# Patient Record
Sex: MALE | Race: WHITE
[De-identification: names, ages, dates, MRNs, and addresses within clinical notes are randomized per-mention and may not be internally consistent; named-entity substitution may affect disease eponyms.]

---

## 2017-05-19 ENCOUNTER — HOSPITAL ENCOUNTER (OUTPATIENT)
Dept: HOSPITAL 39 - US | Age: 72
Discharge: HOME | End: 2017-05-19
Attending: FAMILY MEDICINE
Payer: MEDICARE

## 2017-05-19 DIAGNOSIS — R31.21: Primary | ICD-10-CM

## 2017-05-19 NOTE — US
EXAM DESCRIPTION: 



Renal



CLINICAL HISTORY: 



MICROSCOPIC HEMATURIA



COMPARISON: 



None Available.



TECHNIQUE: 



Multiple sonographic images of the kidneys and urinary bladder.



FINDINGS: 



The right kidney is normal measuring 10.6 cm. Slightly exophytic

16 mm midsegment renal cysts noted. No mass or hydronephrosis. 



The left kidney is normal measuring 11.0 cm. Echogenic stone

nonobstructing 3 mm stones noted within the left kidney. No cyst,

mass or evidence of hydronephrosis. 



Urinary bladder was nondistended and not evaluated.



IMPRESSION: 



1. Unremarkable renal ultrasound.



Electronically signed by:  Kaveh Urbano MD  5/19/2017 4:36 PM

CDT

## 2017-08-09 ENCOUNTER — HOSPITAL ENCOUNTER (OUTPATIENT)
Dept: HOSPITAL 39 - GMA | Age: 72
Discharge: HOME | End: 2017-08-09
Payer: MEDICARE

## 2017-08-09 DIAGNOSIS — R17: Primary | ICD-10-CM

## 2018-01-03 ENCOUNTER — HOSPITAL ENCOUNTER (OUTPATIENT)
Dept: HOSPITAL 39 - GMAM | Age: 73
Discharge: HOME | End: 2018-01-03
Attending: FAMILY MEDICINE
Payer: MEDICARE

## 2018-01-03 DIAGNOSIS — R73.9: Primary | ICD-10-CM

## 2018-01-03 DIAGNOSIS — Z12.5: ICD-10-CM

## 2018-01-03 DIAGNOSIS — Z13.220: ICD-10-CM

## 2018-11-19 ENCOUNTER — HOSPITAL ENCOUNTER (OUTPATIENT)
Dept: HOSPITAL 39 - GMAM | Age: 73
End: 2018-11-19
Attending: FAMILY MEDICINE
Payer: MEDICARE

## 2018-11-19 DIAGNOSIS — M25.50: Primary | ICD-10-CM

## 2018-11-20 ENCOUNTER — HOSPITAL ENCOUNTER (OUTPATIENT)
Dept: HOSPITAL 39 - CT | Age: 73
End: 2018-11-20
Attending: FAMILY MEDICINE
Payer: MEDICARE

## 2018-11-20 DIAGNOSIS — J98.11: ICD-10-CM

## 2018-11-20 DIAGNOSIS — F17.200: Primary | ICD-10-CM

## 2018-11-20 DIAGNOSIS — K44.9: ICD-10-CM

## 2018-11-20 NOTE — CT
EXAM DESCRIPTION: 

Chest w/Contrast : Computed Tomography.



CLINICAL HISTORY: 

F17.200



COMPARISON: 

CT abdomen and pelvis with contrast 8/17/2016.



TECHNIQUE: 

Spiral-axial scans at 5 x 5 mm intervals through the lungs and

thorax with IV contrast. Helical axial 2.5 x 5 mm lung algorithm

axial reconstructions. Coronal and sagittal Mm reconstructions.

No adverse reactions.  Total Exam DLP: 675.9 mGy-cm. This exam

was performed according to our departmental dose-optimization

program which includes automated exposure control, adjustment of

the mA and/or kV according to patient size and/or use of

iterative reconstruction technique; to reduce radiation dose to

as low as reasonably achievable (ALARA).  Nodule measurements

under 10 mm are given as mean value of 3 axes diameters.



FINDINGS: 

Lungs and large airways: Bilateral subpleural densities in the

gravity dependent upper lobes and lower lobes most likely mild

atelectasis. No abnormal nodules, masses, or infiltrates. Airways

are unremarkable.



Pleural spaces: Bilateral small blebs posterior to the upper and

lower lobes which may be gravity dependent. No effusion

bilaterally or pneumothorax..



Mediastinum and Jacquie: No enlarged nodes are soft tissue masses.



Great vessels and Heart: Atherosclerotic calcification the

proximal brachiocephalic vessels aortic arch and descending

thoracic aorta with intimal wall thickening in the distal

descending thoracic aorta. Coronary artery calcification.



Soft tissues of neck base, axillae, and chest wall: Small

axillary lymph nodes.



Upper abdomen: Surgical clips in the gallbladder fossa with no

fluid. Posterior left hemidiaphragm hernia on the medial aspect.

Hernia neck is 4.3 cm transverse and 2.2 cm craniocaudal.

Contains mesentery but no bowel or organs although the posterior

spleen is abutting the hernia neck.



Osseous structures: Multiple levels of spondylosis in the

included thoracic spine. Bilateral sternoclavicular arthrosis.

Minimal hypertrophic changes on the bilateral anterior glenoid

rims abutting the glenohumeral joint.



IMPRESSION: 

1. Bilateral subpleural dependent atelectasis with minimal blebs

bilaterally in the pleura. No pleural effusion or pneumothorax.

No abnormal nodules masses or infiltrates in the lungs.

2. Posterior left medial hemidiaphragmatic hernia containing

mesentery only. Abutting the medial left lower lobe and the

posterior recess. Spleen is abutting the hernia neck.



Electronically signed by:  Asher Stewart MD  11/20/2018 2:22 PM

CST Workstation: [id number]

## 2020-07-07 ENCOUNTER — HOSPITAL ENCOUNTER (OUTPATIENT)
Dept: HOSPITAL 39 - US | Age: 75
End: 2020-07-07
Attending: INTERNAL MEDICINE
Payer: MEDICARE

## 2020-07-07 DIAGNOSIS — I71.4: Primary | ICD-10-CM

## 2020-07-07 NOTE — US
EXAM DESCRIPTION: 

Aorta: Ultrasound.



CLINICAL HISTORY: 

ABDOMINAL AORTIC ANEURYSM WITHOUT RUPTURE



COMPARISON: 

CTA runoff May 2019.



TECHNIQUE: 

Transcutaneous scanning: Two-dimensional and Doppler modes. 



FINDINGS: 

Abdominal aorta diameter - 

Proximal: 2.3 x 2.1 cm. Mid: 2.6 x 2.4 cm. Distal: 3.23 x 2.9 cm.

Common Iliac diameter - Right: 12 mm. Left: 14 mm. 

Other: Atherosclerotic changes in the aorta. 



IMPRESSION: Distal aortic aneurysm. Small aneurysm left common

iliac artery. [facility] Best Practice guidelines: 3.2 cm

abdominal aortic aneurysm. Recommend follow-up every 3 years.

Reference: J Am Farshad Radiol 2013;10:789-794.





Electronically signed by:  Asher Stewart MD  7/7/2020 8:53 AM CDT

Workstation: [id number]

## 2021-01-06 ENCOUNTER — HOSPITAL ENCOUNTER (OUTPATIENT)
Dept: HOSPITAL 39 - GMAM | Age: 76
End: 2021-01-06
Attending: FAMILY MEDICINE
Payer: MEDICARE

## 2021-01-06 DIAGNOSIS — I10: ICD-10-CM

## 2021-01-06 DIAGNOSIS — R73.9: ICD-10-CM

## 2021-01-06 DIAGNOSIS — E78.2: ICD-10-CM

## 2021-01-06 DIAGNOSIS — R97.20: Primary | ICD-10-CM
